# Patient Record
Sex: MALE | Race: WHITE | URBAN - METROPOLITAN AREA
[De-identification: names, ages, dates, MRNs, and addresses within clinical notes are randomized per-mention and may not be internally consistent; named-entity substitution may affect disease eponyms.]

---
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
## 2023-03-24 ENCOUNTER — OFFICE VISIT (OUTPATIENT)
Dept: FAMILY MEDICINE CLINIC | Age: 18
End: 2023-03-24

## 2023-03-24 VITALS
WEIGHT: 209.5 LBS | BODY MASS INDEX: 29.22 KG/M2 | HEART RATE: 70 BPM | SYSTOLIC BLOOD PRESSURE: 126 MMHG | DIASTOLIC BLOOD PRESSURE: 76 MMHG | OXYGEN SATURATION: 97 %

## 2023-03-24 DIAGNOSIS — F32.A DEPRESSION, UNSPECIFIED DEPRESSION TYPE: Primary | ICD-10-CM

## 2023-03-24 DIAGNOSIS — F41.9 ANXIETY: ICD-10-CM

## 2023-03-24 RX ORDER — BUPROPION HYDROCHLORIDE 150 MG/1
150 TABLET ORAL DAILY
Qty: 30 TABLET | Refills: 2 | Status: SHIPPED | OUTPATIENT
Start: 2023-03-24

## 2023-03-24 NOTE — PROGRESS NOTES
Chief Complaint   Patient presents with    Follow-up    Medication Evaluation     Change anxiety/depression meds   Visit Vitals  BP (!) 144/96 (BP 1 Location: Right arm, BP Patient Position: Sitting, BP Cuff Size: Adult)   Pulse 85   Wt 209 lb 8 oz (95 kg)   SpO2 96%   BMI 29.22 kg/m²

## 2023-03-24 NOTE — PATIENT INSTRUCTIONS
Start taking Zoloft 25mg daily (1/2 tab of the 50mg tab).   Start Wellbutrin     Make an appointment with one of the following clinics for ADHD testing:   [facility] - Davis Bishop, PhD - [phone] [id number] Sharla Kingsley - [phone]

## 2023-03-24 NOTE — LETTER
NOTIFICATION RETURN TO WORK / SCHOOL    3/24/2023 2:54 PM    Mr. Lonnie Warner  [facility] [address] [id number]      To Whom It May Concern:    Lonnie Warner is currently under the care of [address]. He will return to work/school on: 3/27/23    If there are questions or concerns please have the patient contact our office.         Sincerely,      Pita Davila MD

## 2023-03-24 NOTE — PROGRESS NOTES
[address] [facility] Sports Medicine      Chief Complaint:   Chief Complaint   Patient presents with    Follow-up    Medication Evaluation     Change anxiety/depression meds       SUBJECTIVE:  Romel Mcgarry is a 19 y.o. male who presents for follow up on anxiety and depression. He feels like the zoloft is not doing as well as in the past. He feels difficulty with getting motivated and decreased energy. Difficulty focusing and completing task as well. No SI/HI. He would like to get tested for ADHD as well. He has not been doing counseling. He started an exercise routine last week and feels like this is helping. Reports good diet. Sleeping around 8 hours a night and feels well rested. PMHx:  Past Medical History:   Diagnosis Date    History of pneumonia as a child        Meds:   Current Outpatient Medications   Medication Sig Dispense Refill    sertraline (ZOLOFT) 50 mg tablet Take 1 Tablet by mouth daily. 30 Tablet 5       Allergies:   No Known Allergies    Smoker:  Social History     Tobacco Use   Smoking Status Never    Passive exposure: Past   Smokeless Tobacco Never       ETOH:   Social History     Substance and Sexual Activity   Alcohol Use None       FH:   Family History   Problem Relation Age of Onset    Liver Disease Mother        ROS: Per HPI    Physical Exam:  Visit Vitals  BP (!) 144/96 (BP 1 Location: Right arm, BP Patient Position: Sitting, BP Cuff Size: Adult)    RECHECK: 126/76   Pulse 85   Wt 209 lb 8 oz (95 kg)   SpO2 96%   BMI 29.22 kg/m²     GEN: No apparent distress. Alert and oriented and responds to all questions appropriately. EYES:  Conjunctiva clear;      LUNGS: Respirations unlabored; clear to auscultation bilaterally  CARDIOVASCULAR: Regular, rate, and rhythm without murmurs, gallops or rubs   NEUROLOGIC:  No focal neurologic deficits. EXT: Well perfused. No edema.   SKIN: No obvious cynthia. Assessment:    ICD-10-CM ICD-9-CM    1. Depression, unspecified depression type  F32. A 311       2. Anxiety  F41.9 300.00           Plan:  Depression/Anxiety: Not as well controlled as previously. Will decreased Zoloft to 25 mg daily (1/2 tab of 50mg tab). Start Wellbutrin XL 150mg daily. Recommend he start counseling through school resources. He is interested in ADD testing. He will first try school resources. If he is unable to get ADD testing through [employer] then he will call one of the resources provided for testing.       RTC: 1 month

## 2023-09-11 RX ORDER — BUPROPION HYDROCHLORIDE 150 MG/1
150 TABLET ORAL EVERY MORNING
Qty: 30 TABLET | Refills: 1 | Status: SHIPPED | OUTPATIENT
Start: 2023-09-11

## 2023-09-11 RX ORDER — BUPROPION HYDROCHLORIDE 150 MG/1
TABLET ORAL
Qty: 30 TABLET | OUTPATIENT
Start: 2023-09-11

## 2023-09-11 NOTE — PROGRESS NOTES
Patient emailed and needs a refill of wellbutrin XL 150mg which he has been taking for the last 5 months. He has been off Zoloft for 6 months. Will refill medication and he will make a follow up appointment this month.

## 2023-10-25 ENCOUNTER — OFFICE VISIT (OUTPATIENT)
Age: 18
End: 2023-10-25
Payer: COMMERCIAL

## 2023-10-25 VITALS
BODY MASS INDEX: 28.17 KG/M2 | WEIGHT: 202 LBS | SYSTOLIC BLOOD PRESSURE: 127 MMHG | HEART RATE: 67 BPM | OXYGEN SATURATION: 98 % | DIASTOLIC BLOOD PRESSURE: 54 MMHG

## 2023-10-25 DIAGNOSIS — F41.8 DEPRESSION WITH ANXIETY: Primary | ICD-10-CM

## 2023-10-25 PROCEDURE — 99213 OFFICE O/P EST LOW 20 MIN: CPT | Performed by: FAMILY MEDICINE

## 2023-10-25 RX ORDER — BUPROPION HYDROCHLORIDE 300 MG/1
300 TABLET ORAL EVERY MORNING
Qty: 90 TABLET | Refills: 1 | Status: SHIPPED | OUTPATIENT
Start: 2023-10-25

## 2023-10-25 ASSESSMENT — COLUMBIA-SUICIDE SEVERITY RATING SCALE - C-SSRS
3. HAVE YOU BEEN THINKING ABOUT HOW YOU MIGHT KILL YOURSELF?: NO
7. DID THIS OCCUR IN THE LAST THREE MONTHS: NO
4. HAVE YOU HAD THESE THOUGHTS AND HAD SOME INTENTION OF ACTING ON THEM?: NO
5. HAVE YOU STARTED TO WORK OUT OR WORKED OUT THE DETAILS OF HOW TO KILL YOURSELF? DO YOU INTEND TO CARRY OUT THIS PLAN?: NO

## 2023-10-25 ASSESSMENT — PATIENT HEALTH QUESTIONNAIRE - PHQ9
10. IF YOU CHECKED OFF ANY PROBLEMS, HOW DIFFICULT HAVE THESE PROBLEMS MADE IT FOR YOU TO DO YOUR WORK, TAKE CARE OF THINGS AT HOME, OR GET ALONG WITH OTHER PEOPLE: 1
6. FEELING BAD ABOUT YOURSELF - OR THAT YOU ARE A FAILURE OR HAVE LET YOURSELF OR YOUR FAMILY DOWN: 0
4. FEELING TIRED OR HAVING LITTLE ENERGY: 1
9. THOUGHTS THAT YOU WOULD BE BETTER OFF DEAD, OR OF HURTING YOURSELF: 0
1. LITTLE INTEREST OR PLEASURE IN DOING THINGS: 0
SUM OF ALL RESPONSES TO PHQ QUESTIONS 1-9: 5
SUM OF ALL RESPONSES TO PHQ QUESTIONS 1-9: 5
SUM OF ALL RESPONSES TO PHQ9 QUESTIONS 1 & 2: 1
SUM OF ALL RESPONSES TO PHQ QUESTIONS 1-9: 5
3. TROUBLE FALLING OR STAYING ASLEEP: 1
8. MOVING OR SPEAKING SO SLOWLY THAT OTHER PEOPLE COULD HAVE NOTICED. OR THE OPPOSITE, BEING SO FIGETY OR RESTLESS THAT YOU HAVE BEEN MOVING AROUND A LOT MORE THAN USUAL: 0
5. POOR APPETITE OR OVEREATING: 0
7. TROUBLE CONCENTRATING ON THINGS, SUCH AS READING THE NEWSPAPER OR WATCHING TELEVISION: 2
SUM OF ALL RESPONSES TO PHQ QUESTIONS 1-9: 5
2. FEELING DOWN, DEPRESSED OR HOPELESS: 1

## 2023-10-25 ASSESSMENT — ANXIETY QUESTIONNAIRES
2. NOT BEING ABLE TO STOP OR CONTROL WORRYING: 1
3. WORRYING TOO MUCH ABOUT DIFFERENT THINGS: 1
7. FEELING AFRAID AS IF SOMETHING AWFUL MIGHT HAPPEN: 0
4. TROUBLE RELAXING: 1
1. FEELING NERVOUS, ANXIOUS, OR ON EDGE: 1
6. BECOMING EASILY ANNOYED OR IRRITABLE: 0
GAD7 TOTAL SCORE: 4
IF YOU CHECKED OFF ANY PROBLEMS ON THIS QUESTIONNAIRE, HOW DIFFICULT HAVE THESE PROBLEMS MADE IT FOR YOU TO DO YOUR WORK, TAKE CARE OF THINGS AT HOME, OR GET ALONG WITH OTHER PEOPLE: SOMEWHAT DIFFICULT
5. BEING SO RESTLESS THAT IT IS HARD TO SIT STILL: 0

## 2023-10-25 NOTE — PROGRESS NOTES
Increase dosage  Chief Complaint   Patient presents with    Depression     Vitals:    10/25/23 1339   BP: (!) 127/54   Pulse: 67   SpO2: 98%
medication well tolerated. Will increase to Wellbutrin XL 300mg daily. Encouraged him to continue regular exercise.     RTC: 1 month

## 2023-10-26 PROCEDURE — 90674 CCIIV4 VAC NO PRSV 0.5 ML IM: CPT | Performed by: FAMILY MEDICINE

## 2023-10-26 PROCEDURE — 90471 IMMUNIZATION ADMIN: CPT | Performed by: FAMILY MEDICINE

## 2024-04-11 RX ORDER — BUPROPION HYDROCHLORIDE 300 MG/1
300 TABLET ORAL EVERY MORNING
Qty: 90 TABLET | Refills: 0 | Status: SHIPPED | OUTPATIENT
Start: 2024-04-11

## 2024-06-11 ENCOUNTER — OFFICE VISIT (OUTPATIENT)
Age: 19
End: 2024-06-11
Payer: COMMERCIAL

## 2024-06-11 VITALS
WEIGHT: 202 LBS | SYSTOLIC BLOOD PRESSURE: 112 MMHG | BODY MASS INDEX: 28.17 KG/M2 | HEART RATE: 91 BPM | RESPIRATION RATE: 20 BRPM | DIASTOLIC BLOOD PRESSURE: 85 MMHG | OXYGEN SATURATION: 97 %

## 2024-06-11 DIAGNOSIS — F41.8 DEPRESSION WITH ANXIETY: ICD-10-CM

## 2024-06-11 DIAGNOSIS — K21.9 GASTROESOPHAGEAL REFLUX DISEASE, UNSPECIFIED WHETHER ESOPHAGITIS PRESENT: Primary | ICD-10-CM

## 2024-06-11 PROCEDURE — 99213 OFFICE O/P EST LOW 20 MIN: CPT | Performed by: FAMILY MEDICINE

## 2024-06-11 RX ORDER — OMEPRAZOLE 20 MG/1
20 CAPSULE, DELAYED RELEASE ORAL
Qty: 30 CAPSULE | Refills: 0 | Status: SHIPPED | OUTPATIENT
Start: 2024-06-11

## 2024-06-11 NOTE — PROGRESS NOTES
Patient states he had a serious of gurd and having like burning sensation. He stated last night he had some acid reflux and and abdominal pain. Stool is normal,but he did vomit last night after eating a few hrs ago.   Taking gas-x and took last night and this morning  He states when he burps he feels he's going to vomit ,stools are mores softer due to appetite change.denies blood in stools or feeling constipated.    Vitals:    06/11/24 1331   BP: 112/85   Pulse: 91   Resp: 20   SpO2: 97%      Chief Complaint   Patient presents with    Gastroesophageal Reflux

## 2024-06-11 NOTE — PROGRESS NOTES
[facility] Family Medicine Residency   [facility] Sports Medicine      Chief Complaint:   Chief Complaint   Patient presents with    Gastroesophageal Reflux       SUBJECTIVE:  Dutch Stevenson is a 19 y.o. male who presents for abdominal pain for a couple of days.  Pain mostly located in the epigastric area.  Associated with reflux and increased burping.  He had one episode of vomiting.  Tolerating PO.  No diarrhea or constipation.  He has tried TUMS and gasX with some relief.  No fever.     H/o Anxiety/Depression: Currently taking Wellbutrin XL 300mg daily.  Reports sx are well controlled.      PMHx:  Past Medical History:   Diagnosis Date    Depression with anxiety     History of pneumonia as a child        Meds:   Current Outpatient Medications   Medication Sig Dispense Refill    omeprazole (PRILOSEC) 20 MG delayed release capsule Take 1 capsule by mouth every morning (before breakfast) 30 capsule 0    buPROPion (WELLBUTRIN XL) 300 MG extended release tablet take 1 tablet by mouth every morning 90 tablet 0     No current facility-administered medications for this visit.       Allergies:   Not on File    Smoker:  Social History     Tobacco Use   Smoking Status Never   Smokeless Tobacco Never       ETOH:   Social History     Substance and Sexual Activity   Alcohol Use None       FH:   Family History   Problem Relation Age of Onset    Liver Disease Mother        ROS: Per HPI    Physical Exam:  /85 (Site: Left Upper Arm, Position: Sitting, Cuff Size: Large Adult)   Pulse 91   Resp 20   Wt 91.6 kg (202 lb)   SpO2 97%   BMI 28.17 kg/m²     GEN: No apparent distress. Alert and oriented and responds to all questions appropriately.  EYES:  Conjunctiva clear;    LUNGS: Respirations unlabored; clear to auscultation bilaterally  CARDIOVASCULAR: Regular, rate, and rhythm without murmurs, gallops or rubs   ABDOMEN: Soft; nontender; nondistended; normoactive bowel sounds;

## 2024-10-01 RX ORDER — BUPROPION HYDROCHLORIDE 300 MG/1
300 TABLET ORAL EVERY MORNING
Qty: 90 TABLET | Refills: 0 | Status: SHIPPED | OUTPATIENT
Start: 2024-10-01

## 2025-01-15 ENCOUNTER — OFFICE VISIT (OUTPATIENT)
Age: 20
End: 2025-01-15

## 2025-01-15 VITALS
WEIGHT: 209 LBS | TEMPERATURE: 98.2 F | HEART RATE: 81 BPM | BODY MASS INDEX: 29.26 KG/M2 | SYSTOLIC BLOOD PRESSURE: 138 MMHG | HEIGHT: 71 IN | DIASTOLIC BLOOD PRESSURE: 72 MMHG | OXYGEN SATURATION: 97 % | RESPIRATION RATE: 18 BRPM

## 2025-01-15 DIAGNOSIS — Z23 NEED FOR IMMUNIZATION AGAINST INFLUENZA: ICD-10-CM

## 2025-01-15 DIAGNOSIS — F41.8 DEPRESSION WITH ANXIETY: Primary | ICD-10-CM

## 2025-01-15 DIAGNOSIS — Z23 IMMUNIZATION DUE: ICD-10-CM

## 2025-01-15 RX ORDER — BUPROPION HYDROCHLORIDE 300 MG/1
300 TABLET ORAL EVERY MORNING
Qty: 90 TABLET | Refills: 1 | Status: SHIPPED | OUTPATIENT
Start: 2025-01-15

## 2025-01-15 SDOH — ECONOMIC STABILITY: FOOD INSECURITY: WITHIN THE PAST 12 MONTHS, YOU WORRIED THAT YOUR FOOD WOULD RUN OUT BEFORE YOU GOT MONEY TO BUY MORE.: NEVER TRUE

## 2025-01-15 SDOH — ECONOMIC STABILITY: TRANSPORTATION INSECURITY
IN THE PAST 12 MONTHS, HAS THE LACK OF TRANSPORTATION KEPT YOU FROM MEDICAL APPOINTMENTS OR FROM GETTING MEDICATIONS?: NO

## 2025-01-15 SDOH — ECONOMIC STABILITY: FOOD INSECURITY: WITHIN THE PAST 12 MONTHS, THE FOOD YOU BOUGHT JUST DIDN'T LAST AND YOU DIDN'T HAVE MONEY TO GET MORE.: NEVER TRUE

## 2025-01-15 SDOH — ECONOMIC STABILITY: INCOME INSECURITY: IN THE LAST 12 MONTHS, WAS THERE A TIME WHEN YOU WERE NOT ABLE TO PAY THE MORTGAGE OR RENT ON TIME?: NO

## 2025-01-15 SDOH — ECONOMIC STABILITY: TRANSPORTATION INSECURITY
IN THE PAST 12 MONTHS, HAS LACK OF TRANSPORTATION KEPT YOU FROM MEETINGS, WORK, OR FROM GETTING THINGS NEEDED FOR DAILY LIVING?: NO

## 2025-01-15 ASSESSMENT — PATIENT HEALTH QUESTIONNAIRE - PHQ9
SUM OF ALL RESPONSES TO PHQ QUESTIONS 1-9: 0
SUM OF ALL RESPONSES TO PHQ9 QUESTIONS 1 & 2: 0
SUM OF ALL RESPONSES TO PHQ QUESTIONS 1-9: 0
SUM OF ALL RESPONSES TO PHQ QUESTIONS 1-9: 0
1. LITTLE INTEREST OR PLEASURE IN DOING THINGS: NOT AT ALL
2. FEELING DOWN, DEPRESSED OR HOPELESS: NOT AT ALL
SUM OF ALL RESPONSES TO PHQ QUESTIONS 1-9: 0

## 2025-01-15 NOTE — PROGRESS NOTES
Identified pt with two pt identifiers(name and ). Reviewed record in preparation for visit and have obtained necessary documentation.  Chief Complaint   Patient presents with    Medication Refill        Vitals:    01/15/25 1344   BP: 138/72   Site: Right Upper Arm   Position: Sitting   Cuff Size: Large Adult   Pulse: 81   Resp: 18   Temp: 98.2 °F (36.8 °C)   TempSrc: Oral   SpO2: 97%   Weight: 94.8 kg (209 lb)   Height: 1.803 m (5' 11\")         Coordination of Care Questionnaire:  :     \"Have you been to the ER, urgent care clinic since your last visit?  Hospitalized since your last visit?\"    NO    “Have you seen or consulted any other health care providers outside of [facility] since your last visit?”    NO            Click Here for Release of Records Request

## 2025-01-15 NOTE — PROGRESS NOTES
[facility] Family Medicine Residency   [facility] Sports Medicine      Chief Complaint:   Chief Complaint   Patient presents with    Medication Refill       SUBJECTIVE:  Dutch Stevenson is a 19 y.o. male who presents for     Anxiety/Depression: Currently taking Wellbutrin XL 300mg daily.  Reports sx are well controlled.  Some stress associated with starting the semester at [employer] but reports handling the stress well.  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        PMHx:  Past Medical History:   Diagnosis Date    Depression with anxiety     History of pneumonia as a child        Meds:   Current Outpatient Medications   Medication Sig Dispense Refill    buPROPion (WELLBUTRIN XL) 300 MG extended release tablet Take 1 tablet by mouth every morning 90 tablet 1     No current facility-administered medications for this visit.       Allergies:   No Known Allergies    Smoker:  Social History     Tobacco Use   Smoking Status Never   Smokeless Tobacco Never       ETOH:   Social History     Substance and Sexual Activity   Alcohol Use Yes       FH:   Family History   Problem Relation Age of Onset    Liver Disease Mother        ROS: Per HPI    Physical Exam:  /72 (Site: Right Upper Arm, Position: Sitting, Cuff Size: Large Adult)   Pulse 81   Temp 98.2 °F (36.8 °C) (Oral)   Resp 18   Ht 1.803 m (5' 11\")   Wt 94.8 kg (209 lb)   SpO2 97%   BMI 29.15 kg/m²     GEN: No apparent distress. Alert and oriented and responds to all questions appropriately.  EYES:  Conjunctiva clear;   LUNGS: Respirations unlabored; clear to auscultation bilaterally  CARDIOVASCULAR: Regular, rate, and rhythm without murmurs, gallops or rubs   ABDOMEN: Soft; nontender; nondistended; normoactive bowel sounds;   NEUROLOGIC:  No focal neurologic deficits.   EXT: Well perfused. No edema.  SKIN: No obvious rashes.      Assessment:   Diagnosis Orders   1. Need for immunization against

## 2025-02-24 ENCOUNTER — OFFICE VISIT (OUTPATIENT)
Age: 20
End: 2025-02-24
Payer: COMMERCIAL

## 2025-02-24 VITALS
BODY MASS INDEX: 28.9 KG/M2 | OXYGEN SATURATION: 98 % | RESPIRATION RATE: 18 BRPM | WEIGHT: 206.4 LBS | DIASTOLIC BLOOD PRESSURE: 53 MMHG | HEART RATE: 63 BPM | HEIGHT: 71 IN | SYSTOLIC BLOOD PRESSURE: 113 MMHG | TEMPERATURE: 97.7 F

## 2025-02-24 DIAGNOSIS — L98.9 SCALP LESION: Primary | ICD-10-CM

## 2025-02-24 PROCEDURE — 99213 OFFICE O/P EST LOW 20 MIN: CPT | Performed by: FAMILY MEDICINE

## 2025-02-24 ASSESSMENT — PATIENT HEALTH QUESTIONNAIRE - PHQ9
2. FEELING DOWN, DEPRESSED OR HOPELESS: NOT AT ALL
SUM OF ALL RESPONSES TO PHQ9 QUESTIONS 1 & 2: 0
1. LITTLE INTEREST OR PLEASURE IN DOING THINGS: NOT AT ALL
SUM OF ALL RESPONSES TO PHQ QUESTIONS 1-9: 0

## 2025-02-24 NOTE — PROGRESS NOTES
[facility] Family Medicine Residency   [facility] Sports Medicine      Chief Complaint:   Chief Complaint   Patient presents with    Mass       SUBJECTIVE:  Dutch Stevenson is a 20 y.o. male who presents for left sided scalp lesion.  Has gotten bigger.  Bleeds intermittently. Mild pain when he hits it with a comb.      PMHx:  Past Medical History:   Diagnosis Date    Depression with anxiety     History of pneumonia as a child        Meds:   Current Outpatient Medications   Medication Sig Dispense Refill    buPROPion (WELLBUTRIN XL) 300 MG extended release tablet Take 1 tablet by mouth every morning 90 tablet 1     No current facility-administered medications for this visit.       Allergies:   No Known Allergies    Smoker:  Social History     Tobacco Use   Smoking Status Never   Smokeless Tobacco Never       ETOH:   Social History     Substance and Sexual Activity   Alcohol Use Yes       FH:   Family History   Problem Relation Age of Onset    Liver Disease Mother        ROS: Per HPI    Physical Exam:  BP (!) 113/53 (Site: Right Upper Arm, Position: Sitting, Cuff Size: Medium Adult)   Pulse 63   Temp 97.7 °F (36.5 °C) (Oral)   Resp 18   Ht 1.803 m (5' 11\")   Wt 93.6 kg (206 lb 6.4 oz)   SpO2 98%   BMI 28.79 kg/m²     GEN: No apparent distress. Alert and oriented and responds to all questions appropriately.  EYES:  Conjunctiva clear;   LUNGS: Respirations unlabored;   NEUROLOGIC:  No focal neurologic deficits.   EXT: Well perfused. No edema.  SKIN: No obvious rashes. 1.5 x 2cm flesh colored skin lesion on the left side of the scalp.      Assessment:   Diagnosis Orders   1. Scalp lesion  [facility] - Syed Kerr MD, General Surgery, [facility]          Plan:   Referred [facility], Dr. Kerr, for excision.    RTC: He will follow up after excision.

## 2025-02-24 NOTE — PROGRESS NOTES
Pt here today for a limp on his Rt side of his scalp for a while now, but most recently it has started bleeding, hurts a little.       Identified pt with two pt identifiers(name and ). Reviewed record in preparation for visit and have obtained necessary documentation.  Chief Complaint   Patient presents with    Mass        Vitals:    25 0923   BP: (!) 113/53   Site: Right Upper Arm   Position: Sitting   Cuff Size: Medium Adult   Pulse: 63   Resp: 18   Temp: 97.7 °F (36.5 °C)   TempSrc: Oral   SpO2: 98%   Weight: 93.6 kg (206 lb 6.4 oz)   Height: 1.803 m (5' 11\")         Coordination of Care Questionnaire:  :     \"Have you been to the ER, urgent care clinic since your last visit?  Hospitalized since your last visit?\"    NO    “Have you seen or consulted any other health care providers outside of [facility] since your last visit?”    NO            Click Here for Release of Records Request

## 2025-02-25 ENCOUNTER — PATIENT MESSAGE (OUTPATIENT)
Age: 20
End: 2025-02-25